# Patient Record
Sex: MALE | Race: WHITE | Employment: FULL TIME | URBAN - METROPOLITAN AREA
[De-identification: names, ages, dates, MRNs, and addresses within clinical notes are randomized per-mention and may not be internally consistent; named-entity substitution may affect disease eponyms.]

---

## 2018-12-07 ENCOUNTER — HOSPITAL ENCOUNTER (EMERGENCY)
Age: 53
Discharge: HOME | End: 2018-12-07
Attending: EMERGENCY MEDICINE
Payer: COMMERCIAL

## 2018-12-07 VITALS
HEIGHT: 71 IN | WEIGHT: 195 LBS | HEART RATE: 88 BPM | OXYGEN SATURATION: 96 % | TEMPERATURE: 98.8 F | BODY MASS INDEX: 27.3 KG/M2 | DIASTOLIC BLOOD PRESSURE: 65 MMHG | RESPIRATION RATE: 17 BRPM | SYSTOLIC BLOOD PRESSURE: 114 MMHG

## 2018-12-07 DIAGNOSIS — I82.4Z1 ACUTE DEEP VEIN THROMBOSIS (DVT) OF DISTAL VEIN OF RIGHT LOWER EXTREMITY (HCC): Primary | ICD-10-CM

## 2018-12-07 PROCEDURE — 74011250637 HC RX REV CODE- 250/637: Performed by: STUDENT IN AN ORGANIZED HEALTH CARE EDUCATION/TRAINING PROGRAM

## 2018-12-07 PROCEDURE — 99283 EMERGENCY DEPT VISIT LOW MDM: CPT

## 2018-12-07 PROCEDURE — 93971 EXTREMITY STUDY: CPT

## 2018-12-07 RX ADMIN — RIVAROXABAN 15 MG: 15 TABLET, FILM COATED ORAL at 22:52

## 2018-12-07 NOTE — LETTER
NOTIFICATION RETURN TO WORK / SCHOOL 
 
12/7/2018 10:19 PM 
 
Mr. Blossom Genao [address] [place] [address] To Whom It May Concern: Blossom Genao is currently under the care of [facility] EMERGENCY DEPT. He will return to work/school on: 12/10/18. If there are questions or concerns please have the patient contact our office. Sincerely, Edwin Wakefield M.D.

## 2018-12-08 NOTE — ED PROVIDER NOTES
47yo male with no prior history who presents with right calf pain x3 days. Symptoms are described as throbbing, non-radiating pain in right calf rated as 6/10. The pain is present at rest but worse with movement, especially dorsiflexion. The patient has taken ASA for 2 days without relief. He was seen today at [facility] for this complaint, was found to have an elevated D-dimer, and was sent to the ED for further eval. The patient denies swelling, erythema, SOB, chest pain, palpations, or recent illness. He denies a personal or family history of blood clots or PE, prolonged immobility, or travel. Social hx: Drinks 3-6 beers daily and smokes marijuana socially. He denies tobacco use. No past medical history on file. No past surgical history on file. No family history on file. Social History Socioeconomic History  Marital status: SINGLE Spouse name: Not on file  Number of children: Not on file  Years of education: Not on file  Highest education level: Not on file Social Needs  Financial resource strain: Not on file  Food insecurity - worry: Not on file  Food insecurity - inability: Not on file  Transportation needs - medical: Not on file  Transportation needs - non-medical: Not on file Occupational History  Not on file Tobacco Use  Smoking status: Not on file Substance and Sexual Activity  Alcohol use: Not on file  Drug use: Not on file  Sexual activity: Not on file Other Topics Concern  Not on file Social History Narrative  Not on file ALLERGIES: Patient has no known allergies. Review of Systems Constitutional: Negative for chills, fatigue and fever. HENT: Negative. Respiratory: Negative for cough, chest tightness, shortness of breath and wheezing. Cardiovascular: Negative for chest pain, palpitations and leg swelling. Gastrointestinal: Negative. Genitourinary: Negative. Musculoskeletal: Right calf pain Skin: Negative for color change. Neurological: Negative for syncope, weakness and light-headedness. Vitals:  
 12/07/18 2031 BP: 114/65 Pulse: 88 Resp: 17 Temp: 98.8 °F (37.1 °C) SpO2: 96% Weight: 88.5 kg (195 lb) Height: 5' 11\" (1.803 m) Physical Exam  
Constitutional: He is oriented to person, place, and time. He appears well-developed and well-nourished. HENT:  
Head: Normocephalic and atraumatic. Eyes: Conjunctivae and EOM are normal. Pupils are equal, round, and reactive to light. Neck: Normal range of motion. Neck supple. Cardiovascular: Normal rate, regular rhythm, normal heart sounds and intact distal pulses. Pulmonary/Chest: Effort normal and breath sounds normal.  
Abdominal: Soft. Bowel sounds are normal.  
Musculoskeletal: Normal range of motion. Non-pitting edema RLE>LLE up to mid-calf. Mild erythema in RLE. Neurological: He is alert and oriented to person, place, and time. Skin: Skin is warm and dry. MDM Number of Diagnoses or Management Options Acute deep vein thrombosis (DVT) of distal vein of right lower extremity [facility]):  
Diagnosis management comments: 54yo with no prior history who presents with right calf pain and swelling concerning for unprovoked DVT. D-dimer reported as elevated at outside facility. Exam reveals tenderness to palpation of right calf, mild erythema, and RLE edema. VSS. Will obtain LE doppler. 22:15- LE doppler shows right saphenous and popliteal DVT. Will give Xarelto 15mg once and discharge home with prescription for Xarelto. Recommended f/u with Hematology and PCP. Procedures

## 2018-12-08 NOTE — ED TRIAGE NOTES
Pt reports right lower leg pain x 3 days that has been getting worse. Has taken aspirin the past 2 days with no relief. Denies recent long travels. Seen at [facility] today, (+)elevated D dimer. Sent to r/o DVT. Denies hx of blood clots or on blood thinners.

## 2018-12-08 NOTE — PROCEDURES
[facility]  *** FINAL REPORT ***    Name: Tomasa Hubbard  MRN: [id number]    Inpatient  : 1965  HIS Order #: [id number]  [id number] [facility] Visit #: [id number]  Date: 07 Dec 2018    TYPE OF TEST: Peripheral Venous Testing    REASON FOR TEST  Pain in limb    Right Leg:-  Deep venous thrombosis:           Yes  Proximal extent of thrombus:      Popliteal Below The Knee  Superficial venous thrombosis:    No  Deep venous insufficiency:        No  Superficial venous insufficiency: No      INTERPRETATION/FINDINGS  PROCEDURE:  RIGHT LOWER EXTREMITY  VENOUS DUPLEX. Evaluation of lower  extremity veins with ultrasound (B-mode imaging, pulsed Doppler, color   Doppler). Includes the common femoral, deep femoral, femoral,  popliteal, posterior tibial, peroneal, and great saphenous veins. Other veins, for example the gastrocnemius and soleal veins, may also  be visualized. FINDINGS: There is evidence of Acute occlusive deep vein thrombosis in   the right posterior and peroneal veins and indeterminated age  thrombus noted in the distal popliteal vein. Gray scale and color flow   duplex images of the remaining veins in the right lower extremity  demonstrate normal compressibility, spontaneous and augmented flow  profiles, and absence of filling defects throughout the deep and  superficial veins in the right lower extremity. CONCLUSION:Right lower extremity venous duplex positive for deep  venous thrombosis in the veins listed above or thrombophlebitis. Left  common femoral vein is thrombus free. ADDITIONAL COMMENTS    I have personally reviewed the data relevant to the interpretation of  this  study. TECHNOLOGIST: Renetta Hare  Signed: 2018 10:49 PM    PHYSICIAN: Víctor Yeh.  Frannie Law MD  Signed: 12/10/2018 08:00 AM

## 2019-01-02 ENCOUNTER — DOCUMENTATION ONLY (OUTPATIENT)
Dept: ONCOLOGY | Age: 54
End: 2019-01-02

## 2019-01-02 ENCOUNTER — OFFICE VISIT (OUTPATIENT)
Dept: ONCOLOGY | Age: 54
End: 2019-01-02

## 2019-01-02 VITALS
BODY MASS INDEX: 27.16 KG/M2 | TEMPERATURE: 98.5 F | SYSTOLIC BLOOD PRESSURE: 130 MMHG | HEART RATE: 64 BPM | WEIGHT: 194 LBS | RESPIRATION RATE: 20 BRPM | HEIGHT: 71 IN | DIASTOLIC BLOOD PRESSURE: 85 MMHG | OXYGEN SATURATION: 98 %

## 2019-01-02 DIAGNOSIS — I82.431 ACUTE DEEP VEIN THROMBOSIS (DVT) OF POPLITEAL VEIN OF RIGHT LOWER EXTREMITY (HCC): Primary | ICD-10-CM

## 2019-01-02 RX ORDER — RANITIDINE 150 MG/1
150 TABLET, FILM COATED ORAL 2 TIMES DAILY
COMMUNITY
End: 2020-01-09 | Stop reason: ALTCHOICE

## 2019-01-02 RX ORDER — BISMUTH SUBSALICYLATE 262 MG
1 TABLET,CHEWABLE ORAL DAILY
COMMUNITY

## 2019-01-02 RX ORDER — CLONAZEPAM 1 MG/1
TABLET ORAL
Refills: 1 | COMMUNITY
Start: 2018-12-22

## 2019-01-02 RX ORDER — MULTIVIT WITH MINERALS/HERBS
1 TABLET ORAL DAILY
COMMUNITY

## 2019-01-02 RX ORDER — CHOLECALCIFEROL (VITAMIN D3) 125 MCG
CAPSULE ORAL
COMMUNITY

## 2019-01-02 RX ORDER — BUPROPION HYDROCHLORIDE 300 MG/1
TABLET ORAL
Refills: 0 | COMMUNITY
Start: 2018-12-27

## 2019-01-02 RX ORDER — DOXEPIN HYDROCHLORIDE 10 MG/1
CAPSULE ORAL
Refills: 1 | COMMUNITY
Start: 2018-12-21

## 2019-01-02 NOTE — PROGRESS NOTES
[facility] [place] at [place] [address] [address] [facility], [address] [place] [address] 1007 [facility] W: [phone]  F: [phone] Reason for Visit:  
Phoenix May III is a 46 y.o. male who is seen in consultation at the request of Dr. Liliane Cook [facility] ED) for evaluation of DVT in right calf. History of Present Illness: Ez Allen is a pleasant 46 y.o. male who presents today for evaluation of DVT. Last month he developed several days of right calf pain. Took ASA without relief. Seen at [facility] with labs demonstrating an elevated D-dimer, prompting referral to the ED where an ultrasound confirmed an acute DVT. He was started on rivaroxaban. He is tolerating this well, no bleeding, dose now 20mg PO daily. His leg pain has resolved. Denies cough or dyspnea. Denies personal or family history of VTE. Denies recent risk factors for VTE. No surgeries, hospitalizations, trauma, immobilization, hormone therapy, or long travel. He does travel to [place] frequently to visit his son who is a family practioner at [employer], but these flights aren't extremely long. No tobacco in >30 years. Colonoscopy last year. PSA checked recently with his PCP per his report. Past Medical History:  
Diagnosis Date  Acute deep vein thrombosis (DVT) of right popliteal vein ([address] 12/07/2018  Anxiety  Depression Past Surgical History:  
Procedure Laterality Date  HX APPENDECTOMY  HX CHOLECYSTECTOMY  HX TONSILLECTOMY Social History Tobacco Use  Smoking status: Not on file Substance Use Topics  Alcohol use: Not on file No family history on file. Current Outpatient Medications Medication Sig  
 rivaroxaban (XARELTO) 15 mg (42)- 20 mg (9) DsPk Take one 15 mg tablet twice a day with food for the first 21 days. Then, take one 20 mg tablet once a day with food for 9 days. No current facility-administered medications for this visit. No Known Allergies Review of Systems: A complete review of systems was obtained, negative except as described above. Physical Exam:  
 
Visit Vitals /85 (BP 1 Location: Right arm, BP Patient Position: Sitting) Pulse 64 Temp 98.5 °F (36.9 °C) (Oral) Resp 20 Ht 5' 11\" (1.803 m) Wt 194 lb (88 kg) SpO2 98% BMI 27.06 kg/m² General: No distress Eyes: PERRLA, anicteric sclerae HENT: Atraumatic, OP clear Neck: Supple Lymphatic: No cervical, supraclavicular, or inguinal adenopathy Respiratory: CTAB, normal respiratory effort CV: Normal rate, regular rhythm, no murmurs, no peripheral edema GI: Soft, nontender, nondistended, no masses, no hepatomegaly, no splenomegaly MS: Normal gait and station. Digits without clubbing or cyanosis. Skin: No rashes, ecchymoses, or petechiae. Normal temperature, turgor, and texture. Psych: Alert, oriented, appropriate affect, normal judgment/insight Results:  
Right LE Doppler 12/7/2018: acute DVT in right popliteal vein Records reviewed and summarized above. Assessment:  
1) Right LE DVT, proximal, unprovoked Diagnosed 12/7/2018. No identifiable risk factors at that time. Currently on treatment with rivaroxaban. The current ACCP guidelines recommend patients receive a minimum of three months of anticoagulation after a VTE, during which time they are at the highest risk of progression of their thrombosis. However, patients with unprovoked events, such as this, remain at a high risk of recurrence once anticoagulation is stopped. Specifically, a 10% risk of recurrence within the first year, and 5% annual risk thereafter. Meanwhile, the risk of major bleeding on anticoagulation is <1% annually.   Based on this risk/benefit ratio, the guidelines recommend continuation of anticoagulation long-term for these patients, as long as the benefit continues to outweigh the risks. It was my recommendation today that anticoagulation be continued long term, with periodic evaluation to assess the bleeding risk, fall risk, and the effect therapy is having on the patient's lifestyle. I do not recommend thrombophilia testing at this time. The past history of a VTE poses a greater risk for additional events than anything we would uncover on testing. As long as the patient is comfortable continuing with long-term anticoagulation, thrombophilia testing is not necessary. Therapy with rivaroxaban is appropriate. We also discussed the option of apixaban, which his son had mentioned to him, and we could certainly make this change if he would like. I advised him to check on costs through his insurance, as that may be a determining factor for him. I also had our  meet with him to discuss copay card to help with costs. Patients with unprovoked DVT should undergo a basic evaluation of malignancy, to include basic labs, CXR, and routine cancer screening. He reports that he is uptodate on colonoscopy and PSA screening, and does not qualify for low-dose Chest CT screening. Plan:  
 
· Labs: CBC, CMP · CXR · Continue rivaroxaban 20mg PO daily · Continue anticoagulation long-term · Return to see me in 1 year I appreciate the opportunity to participate in Mr. Nehemias Hart III's care.  
 
Signed By: Aggie Anguiano MD

## 2019-01-02 NOTE — PROGRESS NOTES
[address]  Social Work Navigator Encounter     Patient Name:  Tom Kruse    Narrative: Patient requested Xarelto discount card. Explained to patient Out Financial Counselor will contact [address] [place] [address] to activate discount card and update patient's pharmacy. Discount card will be good for a year and the copay will be $10. Patient voiced understanding and appreciation. Plan:   1. Utilize Xarelto discount card so prescription is more affordable.     Thank you,  STEVEN Oreilly

## 2019-01-04 ENCOUNTER — TELEPHONE (OUTPATIENT)
Dept: INFUSION THERAPY | Age: 54
End: 2019-01-04

## 2019-08-15 DIAGNOSIS — I82.431 ACUTE DEEP VEIN THROMBOSIS (DVT) OF POPLITEAL VEIN OF RIGHT LOWER EXTREMITY (HCC): ICD-10-CM

## 2019-08-15 RX ORDER — RIVAROXABAN 20 MG/1
TABLET, FILM COATED ORAL
Qty: 90 TAB | Refills: 0 | Status: SHIPPED | OUTPATIENT
Start: 2019-08-15 | End: 2019-11-13 | Stop reason: SDUPTHER

## 2019-12-29 NOTE — PROGRESS NOTES
[facility] [place] at [place] [address] [address] [facility]  Nicolasa N[place]: [phone]  F: [phone]      Reason for Visit:   Arjun Egan is a 53 y.o. male who is seen for follow up of DVT. History of Present Illness:   He remains on rivaroxaban, tolerating this well, no new issues. No bleeding. Energy good. No leg pain or swelling. No dyspnea or chest pain. PAST HISTORY: The following sections were reviewed and updated in the EMR as appropriate: PMH, SH, FH, Medications, Allergies. No Known Allergies   Review of Systems: A complete review of systems was obtained, reviewed, and scanned into the EMR. Pertinent findings reviewed above. Physical Exam:     Visit Vitals  /71 (BP 1 Location: Left arm, BP Patient Position: Supine)   Pulse 77   Temp 98.4 °F (36.9 °C) (Temporal)   Resp 16   Ht 5' 11\" (1.803 m)   Wt 183 lb (83 kg)   SpO2 98%   BMI 25.52 kg/m²     General: No distress  Eyes: PERRLA, anicteric sclerae  HENT: Atraumatic, OP clear  Neck: Supple  Lymphatic: No cervical, supraclavicular, or inguinal adenopathy  Respiratory: CTAB, normal respiratory effort  CV: Normal rate, regular rhythm, no murmurs, no peripheral edema  GI: Soft, nontender, nondistended, no masses, no hepatomegaly, no splenomegaly  MS: Normal gait and station. Digits without clubbing or cyanosis. Skin: No rashes, ecchymoses, or petechiae. Normal temperature, turgor, and texture. Psych: Alert, oriented, appropriate affect, normal judgment/insight    Results:   Right LE Doppler 12/7/2018: acute DVT in right popliteal vein        Assessment:   1) Right LE DVT, proximal, unprovoked  Diagnosed 12/7/2018. No identifiable risk factors at that time. Currently on treatment with rivaroxaban. Long-term anticoagulation has been recommended, see initial consult note for details. He is tolerating rivaroxaban well, we will continue with this treatment.     A CXR was recommended in patients with unprovoked VTE as part of a basic evaluation for occult malignancy, but he did not follow through with this. He is now willing. 2) Cancer screening  He reports he is uptodate on colonoscopy and PSA screening.     Plan:     · CXR  · Labs yearly while on anticoagulation: CBC, CMP (he wants to do this with his PCP at their annual visit)  · Continue rivaroxaban 20mg PO daily  · Continue anticoagulation long-term  · Return to see me in 1 year    Signed By: Janice Ledesma MD

## 2020-01-09 ENCOUNTER — OFFICE VISIT (OUTPATIENT)
Dept: ONCOLOGY | Age: 55
End: 2020-01-09

## 2020-01-09 VITALS
OXYGEN SATURATION: 98 % | BODY MASS INDEX: 25.62 KG/M2 | HEART RATE: 77 BPM | WEIGHT: 183 LBS | DIASTOLIC BLOOD PRESSURE: 71 MMHG | RESPIRATION RATE: 16 BRPM | SYSTOLIC BLOOD PRESSURE: 113 MMHG | HEIGHT: 71 IN | TEMPERATURE: 98.4 F

## 2020-01-09 DIAGNOSIS — I82.431 ACUTE DEEP VEIN THROMBOSIS (DVT) OF POPLITEAL VEIN OF RIGHT LOWER EXTREMITY (HCC): Primary | ICD-10-CM

## 2020-01-09 RX ORDER — FAMOTIDINE 10 MG/1
10 TABLET ORAL 2 TIMES DAILY
COMMUNITY

## 2020-01-09 RX ORDER — DULOXETIN HYDROCHLORIDE 60 MG/1
CAPSULE, DELAYED RELEASE ORAL
COMMUNITY
Start: 2019-11-19

## 2020-01-09 NOTE — PROGRESS NOTES
[facility] is a 53 y.o. male follow up for DVT. 1. Have you been to the ER, urgent care clinic since your last visit? Hospitalized since your last visit?no     2. Have you seen or consulted any other health care providers outside of the [address] [place] [address] since your last visit? Include any pap smears or colon screening.  no

## 2021-03-13 DIAGNOSIS — I82.431 ACUTE DEEP VEIN THROMBOSIS (DVT) OF POPLITEAL VEIN OF RIGHT LOWER EXTREMITY (HCC): ICD-10-CM

## 2021-03-15 RX ORDER — RIVAROXABAN 20 MG/1
TABLET, FILM COATED ORAL
Qty: 90 TAB | Refills: 3 | Status: SHIPPED | OUTPATIENT
Start: 2021-03-15 | End: 2021-06-08 | Stop reason: SDUPTHER

## 2021-03-15 NOTE — TELEPHONE ENCOUNTER
[address] at [facility]  [phone]    03/15/21- Phone call placed to patient advised him he is past due for follow up with - he is willing to schedule fu soon. Advised him okay to refill one month supply to last him until he comes in for fu- he requested a 3 month supply as he has to use a coupon due to insurance purposes. Phone call transferred to  staff to schedule fu and okay to refill medication.

## 2021-04-01 ENCOUNTER — TELEPHONE (OUTPATIENT)
Dept: ONCOLOGY | Age: 56
End: 2021-04-01

## 2021-04-01 NOTE — TELEPHONE ENCOUNTER
[facility] at [facility]  [phone]    04/01/21- Phone call placed to pt to remind pt to have labs drawn prior to his follow up appointment with .  left.

## 2021-04-13 LAB
ALBUMIN SERPL-MCNC: 4.1 G/DL (ref 3.5–5)
ALBUMIN/GLOB SERPL: 1.3 {RATIO} (ref 1.1–2.2)
ALP SERPL-CCNC: 79 U/L (ref 45–117)
ALT SERPL-CCNC: 29 U/L (ref 12–78)
ANION GAP SERPL CALC-SCNC: 6 MMOL/L (ref 5–15)
AST SERPL-CCNC: 11 U/L (ref 15–37)
BASOPHILS # BLD: 0.1 K/UL (ref 0–0.1)
BASOPHILS NFR BLD: 1 % (ref 0–1)
BILIRUB SERPL-MCNC: 0.5 MG/DL (ref 0.2–1)
BUN SERPL-MCNC: 13 MG/DL (ref 6–20)
BUN/CREAT SERPL: 12 (ref 12–20)
CALCIUM SERPL-MCNC: 9 MG/DL (ref 8.5–10.1)
CHLORIDE SERPL-SCNC: 104 MMOL/L (ref 97–108)
CO2 SERPL-SCNC: 30 MMOL/L (ref 21–32)
CREAT SERPL-MCNC: 1.11 MG/DL (ref 0.7–1.3)
DIFFERENTIAL METHOD BLD: NORMAL
EOSINOPHIL # BLD: 0.2 K/UL (ref 0–0.4)
EOSINOPHIL NFR BLD: 4 % (ref 0–7)
ERYTHROCYTE [DISTWIDTH] IN BLOOD BY AUTOMATED COUNT: 13.3 % (ref 11.5–14.5)
GLOBULIN SER CALC-MCNC: 3.1 G/DL (ref 2–4)
GLUCOSE SERPL-MCNC: 112 MG/DL (ref 65–100)
HCT VFR BLD AUTO: 44.6 % (ref 36.6–50.3)
HGB BLD-MCNC: 14.5 G/DL (ref 12.1–17)
IMM GRANULOCYTES # BLD AUTO: 0 K/UL (ref 0–0.04)
IMM GRANULOCYTES NFR BLD AUTO: 0 % (ref 0–0.5)
LYMPHOCYTES # BLD: 2 K/UL (ref 0.8–3.5)
LYMPHOCYTES NFR BLD: 38 % (ref 12–49)
MCH RBC QN AUTO: 31.2 PG (ref 26–34)
MCHC RBC AUTO-ENTMCNC: 32.5 G/DL (ref 30–36.5)
MCV RBC AUTO: 95.9 FL (ref 80–99)
MONOCYTES # BLD: 0.5 K/UL (ref 0–1)
MONOCYTES NFR BLD: 9 % (ref 5–13)
NEUTS SEG # BLD: 2.6 K/UL (ref 1.8–8)
NEUTS SEG NFR BLD: 48 % (ref 32–75)
NRBC # BLD: 0 K/UL (ref 0–0.01)
NRBC BLD-RTO: 0 PER 100 WBC
PLATELET # BLD AUTO: 216 K/UL (ref 150–400)
PMV BLD AUTO: 11.7 FL (ref 8.9–12.9)
POTASSIUM SERPL-SCNC: 4.3 MMOL/L (ref 3.5–5.1)
PROT SERPL-MCNC: 7.2 G/DL (ref 6.4–8.2)
RBC # BLD AUTO: 4.65 M/UL (ref 4.1–5.7)
SODIUM SERPL-SCNC: 140 MMOL/L (ref 136–145)
WBC # BLD AUTO: 5.3 K/UL (ref 4.1–11.1)

## 2021-06-04 NOTE — PROGRESS NOTES
[facility] [place] at [place] [address] [address] [facility] 100[address] [facility] [place]: [phone]  F: [phone]      Reason for Visit:   Matheus Sweeney is a 61 y.o. male who is seen for follow up of DVT. History of Present Illness:   He continues to take rivaroxaban. Compliant with therapy, tolerating it well, no significant bleeding. He denies any new medical problems in the past year. No leg pain or swelling. No chest pain or dyspnea. He does have some skin discoloration in his right foot which has progressed over time. He has seen dermatology about this and was advised to see vascular surgery. 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 His son is a family medicine physician at [facility] [employer]. Review of systems was obtained and pertinent findings reviewed above. Past medical history, social history, family history, medications, and allergies are located in the electronic medical record. Physical Exam:     Visit Vitals  BP (!) 142/88 (BP 1 Location: Right arm, BP Patient Position: Sitting, BP Cuff Size: Large adult)   Pulse 86   Temp 98.1 °F (36.7 °C) (Temporal)   Resp 16   Ht 5' 11\" (1.803 m)   Wt 199 lb 6.4 oz (90.4 kg)   SpO2 98%   BMI 27.81 kg/m²     General: no distress  Respiratory: normal respiratory effort  CV: no peripheral edema  Skin: no rashes; no ecchymoses; no petechiae. Venous stasis changes in right foot/ankle        Results:     Lab Results   Component Value Date/Time    WBC 5.3 04/13/2021 11:23 AM    HGB 14.5 04/13/2021 11:23 AM    HCT 44.6 04/13/2021 11:23 AM    PLATELET 114 04/06/0332 11:23 AM    MCV 95.9 04/13/2021 11:23 AM    ABS.  NEUTROPHILS 2.6 04/13/2021 11:23 AM     Lab Results   Component Value Date/Time    Sodium 140 04/13/2021 11:23 AM    Potassium 4.3 04/13/2021 11:23 AM    Chloride 104 04/13/2021 11:23 AM    CO2 30 04/13/2021 11:23 AM    Glucose 112 (H) 04/13/2021 11:23 AM    BUN 13 04/13/2021 11:23 AM    Creatinine 1.11 04/13/2021 11:23 AM    GFR est AA >60 04/13/2021 11:23 AM    GFR est non-AA >60 04/13/2021 11:23 AM    Calcium 9.0 04/13/2021 11:23 AM     Lab Results   Component Value Date/Time    Bilirubin, total 0.5 04/13/2021 11:23 AM    ALT (SGPT) 29 04/13/2021 11:23 AM    Alk. phosphatase 79 04/13/2021 11:23 AM    Protein, total 7.2 04/13/2021 11:23 AM    Albumin 4.1 04/13/2021 11:23 AM    Globulin 3.1 04/13/2021 11:23 AM       Right LE Doppler 12/7/2018: acute DVT in right popliteal vein        Assessment:   1) Right LE DVT, proximal, unprovoked  Diagnosed 12/7/2018. No identifiable risk factors at that time. Currently on treatment with rivaroxaban. Long-term anticoagulation has been recommended, see initial consult note for details. He is tolerating rivaroxaban well, we will continue with this treatment. I have recommended and ordered a CXR on several occasions, which is recommended in patients with unprovoked VTE as part of a basic evaluation for occult malignancy. He has not had this done. 2) Venous stasis changes  Noted in right ankle. Dermatology has referred him to vascular surgery, but he has not shceduled this. Reviewed today, he will continue to think about this.     Plan:     · Labs yearly while on anticoagulation: CBC, CMP  · Continue rivaroxaban 20mg PO daily  · Continue anticoagulation long-term  · Return to see me in 1 year    Signed By: Jose Mortensen MD

## 2021-06-08 ENCOUNTER — OFFICE VISIT (OUTPATIENT)
Dept: ONCOLOGY | Age: 56
End: 2021-06-08
Payer: COMMERCIAL

## 2021-06-08 VITALS
TEMPERATURE: 98.1 F | HEIGHT: 71 IN | HEART RATE: 86 BPM | SYSTOLIC BLOOD PRESSURE: 142 MMHG | BODY MASS INDEX: 27.92 KG/M2 | WEIGHT: 199.4 LBS | RESPIRATION RATE: 16 BRPM | DIASTOLIC BLOOD PRESSURE: 88 MMHG | OXYGEN SATURATION: 98 %

## 2021-06-08 DIAGNOSIS — I82.431 ACUTE DEEP VEIN THROMBOSIS (DVT) OF POPLITEAL VEIN OF RIGHT LOWER EXTREMITY (HCC): Primary | ICD-10-CM

## 2021-06-08 PROCEDURE — 99214 OFFICE O/P EST MOD 30 MIN: CPT | Performed by: INTERNAL MEDICINE

## 2021-06-08 RX ORDER — DULOXETIN HYDROCHLORIDE 30 MG/1
CAPSULE, DELAYED RELEASE ORAL
COMMUNITY
Start: 2021-04-09

## 2021-06-08 NOTE — PROGRESS NOTES
Roxy Medina is a 61 y.o. male follow up for DVT. 1. Have you been to the ER, urgent care clinic since your last visit? Hospitalized since your last visit?no     2. Have you seen or consulted any other health care providers outside of the [address] [place] [address] since your last visit? Include any pap smears or colon screening.  no

## 2022-03-19 PROBLEM — I82.431 ACUTE DEEP VEIN THROMBOSIS (DVT) OF POPLITEAL VEIN OF RIGHT LOWER EXTREMITY (HCC): Status: ACTIVE | Noted: 2019-01-02

## 2022-06-06 ENCOUNTER — TELEPHONE (OUTPATIENT)
Dept: ONCOLOGY | Age: 57
End: 2022-06-06

## 2022-06-06 NOTE — TELEPHONE ENCOUNTER
[address] at [facility]  [phone]    06/06/22- Phone call returned to patient he reported he stopped taking xarelto approximately 6 months ago due to daily nose bleeding. Once he stopped medication bleeding resolved and no further concerns verbalized- no chest pain, SOB, swelling, redness or other symptoms. He is wanting to cancel his upcoming appointment. Advised him per  last note he wanted him to be on anticoagulation long term. He does not want to take medication or to follow up at this time \" I feel okay and my father is a doctor at the [facility] [employer] [facility] [employer] and he is guiding me\". He denied any further questions or concerns.  informed.

## 2022-06-06 NOTE — TELEPHONE ENCOUNTER
Patient called and stated that he can not make it to his appointment this week.  But he would like a call back about Xarelto that he stopped taking  CB# [phone]

## 2022-08-22 NOTE — PROGRESS NOTES
[facility] [place] at [place] [address]  [address] [facility], [address] [facility] 1007 [facility]  B[place]ender: [phone]  F: [phone]      Reason for Visit:   Arno Klinefelter is a 61 y.o. male who is seen for follow up of DVT. History of Present Illness:   He stopped taking his rivaroxaban around 1/2022 due to epistaxis, though he did not call us at that time. He was having significant epistaxis at that time, about 1-2x per week, mostly in the mornings. Stopped after 5-10 minutes. In June he called and cancelled his follow up appointment. Earlier this month he flew to [facility], and while there he began to develop recurrent left calf/leg pain. He was concerned for recurrent VTE, though he did not have dopplers done. He resumed his Xarelto earlier this month and his symptoms improved within a few days. Now on 20mg PO daily. 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 Review of systems was obtained and pertinent findings reviewed above. Past medical history, social history, family history, medications, and allergies are located in the electronic medical record. Physical Exam:     Visit Vitals  /88 (BP 1 Location: Right arm, BP Patient Position: Sitting, BP Cuff Size: Large adult)   Pulse 78   Temp 98.5 °F (36.9 °C) (Esophageal)   Resp 16   Ht 5' 11\" (1.803 m)   Wt 196 lb (88.9 kg)   SpO2 97%   BMI 27.34 kg/m²       General: no distress  Respiratory: normal respiratory effort  CV: no peripheral edema  Skin: no rashes; no ecchymoses; no petechiae. Venous stasis changes in right foot/ankle        Results:     Lab Results   Component Value Date/Time    WBC 5.3 04/13/2021 11:23 AM    HGB 14.5 04/13/2021 11:23 AM    HCT 44.6 04/13/2021 11:23 AM    PLATELET 959 06/28/9901 11:23 AM    MCV 95.9 04/13/2021 11:23 AM    ABS.  NEUTROPHILS 2.6 04/13/2021 11:23 AM     Lab Results   Component Value Date/Time    Sodium 140 04/13/2021 11:23 AM    Potassium 4.3 04/13/2021 11:23 AM    Chloride 104 04/13/2021 11:23 AM    CO2 30 04/13/2021 11:23 AM    Glucose 112 (H) 04/13/2021 11:23 AM    BUN 13 04/13/2021 11:23 AM    Creatinine 1.11 04/13/2021 11:23 AM    GFR est AA >60 04/13/2021 11:23 AM    GFR est non-AA >60 04/13/2021 11:23 AM    Calcium 9.0 04/13/2021 11:23 AM     Lab Results   Component Value Date/Time    Bilirubin, total 0.5 04/13/2021 11:23 AM    ALT (SGPT) 29 04/13/2021 11:23 AM    Alk. phosphatase 79 04/13/2021 11:23 AM    Protein, total 7.2 04/13/2021 11:23 AM    Albumin 4.1 04/13/2021 11:23 AM    Globulin 3.1 04/13/2021 11:23 AM       Right LE Doppler 12/7/2018: acute DVT in right popliteal vein        Assessment:   1) Right LE DVT, proximal, unprovoked  Diagnosed 12/7/2018. No identifiable risk factors at that time. Treated with rivaroxaban through 1/2022, patient stopped on his own due to epistaxis. 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 Long-term anticoagulation has been recommended, see initial consult note for details. After he has been back on therapy for 6 months, we can consider reducing his dose to 10mg PO daily. 2) Venous stasis changes  Noted in right ankle. Dermatology has referred him to vascular surgery, but he has not shceduled this. Reviewed today, he will continue to think about this. 3) Epistaxis  Occurred in the winter when on Xarelto, mostly resolved now. Using water with his CPAP which is helping. Consider ENT evaluation if this is a recurring issue.     Plan:     Continue rivaroxaban 20mg PO daily  Labs yearly while on anticoagulation: CBC, CMP  Continue anticoagulation long-term  Return to see me in 1 year    Signed By: Kaley Atkins MD

## 2022-08-24 ENCOUNTER — OFFICE VISIT (OUTPATIENT)
Dept: ONCOLOGY | Age: 57
End: 2022-08-24
Payer: COMMERCIAL

## 2022-08-24 VITALS
HEART RATE: 78 BPM | BODY MASS INDEX: 27.44 KG/M2 | WEIGHT: 196 LBS | RESPIRATION RATE: 16 BRPM | DIASTOLIC BLOOD PRESSURE: 88 MMHG | TEMPERATURE: 98.5 F | OXYGEN SATURATION: 97 % | SYSTOLIC BLOOD PRESSURE: 130 MMHG | HEIGHT: 71 IN

## 2022-08-24 DIAGNOSIS — I82.431 ACUTE DEEP VEIN THROMBOSIS (DVT) OF POPLITEAL VEIN OF RIGHT LOWER EXTREMITY (HCC): Primary | ICD-10-CM

## 2022-08-24 PROCEDURE — 99214 OFFICE O/P EST MOD 30 MIN: CPT | Performed by: INTERNAL MEDICINE

## 2022-08-25 ENCOUNTER — PATIENT MESSAGE (OUTPATIENT)
Dept: ONCOLOGY | Age: 57
End: 2022-08-25

## 2022-09-06 ENCOUNTER — TELEPHONE (OUTPATIENT)
Dept: ONCOLOGY | Age: 57
End: 2022-09-06

## 2022-09-06 NOTE — TELEPHONE ENCOUNTER
[address] at [facility]  [phone]    09/06/22- Informed pt of results per  below- he verbalized understanding. Your labs look good. Your potassium is just slightly elevated. You may want to try reducing the potassium in your diet. I recommend having this rechecked with you primary care physician in a few months.

## 2022-09-06 NOTE — TELEPHONE ENCOUNTER
[address] at [facility]  [phone]    Patient has not ready the [employer] message I sent him previously about his labs. Please call and inform him:    <<<  Your labs look good. Your potassium is just slightly elevated. You may want to try reducing the potassium in your diet.   I recommend having this rechecked with you primary care physician in a few months.  >>>

## 2023-08-23 ENCOUNTER — OFFICE VISIT (OUTPATIENT)
Age: 58
End: 2023-08-23
Payer: COMMERCIAL

## 2023-08-23 VITALS
DIASTOLIC BLOOD PRESSURE: 95 MMHG | OXYGEN SATURATION: 98 % | RESPIRATION RATE: 18 BRPM | WEIGHT: 197 LBS | TEMPERATURE: 99 F | BODY MASS INDEX: 27.58 KG/M2 | HEIGHT: 71 IN | HEART RATE: 75 BPM | SYSTOLIC BLOOD PRESSURE: 150 MMHG

## 2023-08-23 DIAGNOSIS — I82.431 ACUTE EMBOLISM AND THROMBOSIS OF RIGHT POPLITEAL VEIN (HCC): Primary | ICD-10-CM

## 2023-08-23 DIAGNOSIS — I82.431 ACUTE EMBOLISM AND THROMBOSIS OF RIGHT POPLITEAL VEIN (HCC): ICD-10-CM

## 2023-08-23 PROCEDURE — 99214 OFFICE O/P EST MOD 30 MIN: CPT | Performed by: INTERNAL MEDICINE

## 2023-08-23 RX ORDER — RIVAROXABAN 10 MG/1
10 TABLET, FILM COATED ORAL
Qty: 90 TABLET | Refills: 3 | Status: SHIPPED | OUTPATIENT
Start: 2023-08-23

## 2023-08-23 RX ORDER — M-VIT,TX,IRON,MINS/CALC/FOLIC 27MG-0.4MG
TABLET ORAL DAILY
COMMUNITY

## 2023-08-23 NOTE — PROGRESS NOTES
Clement Santillan III is a 60 y.o. male follow up for VTE. 1. Have you been to the ER, urgent care clinic since your last visit? Hospitalized since your last visit?no     2. Have you seen or consulted any other health care providers outside of the [address] [place] [address] since your last visit? Include any pap smears or colon screening.  no
factors at that time. Treated with rivaroxaban through 1/2022, patient stopped on his own due to epistaxis. 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 He has not been compliant with his Xarelto, in part due to some bleeding issues. We reviewed that long-term anticoagulation is recommended given his unprovoked event, though we could alternatively stop therapy or switch to ASA therapy if he is willing to accept an increased risk of recurrent VTE. He is comfortable resuming therapy. We discussed reducing his dose to 10mg daily to see if this helps with his bleeding, and he is agreeable. Venous stasis changes  Noted in right ankle. He is following with Dermatology. They had referred him to vascular surgery, but he declined this. Epistaxis  Intermittent issue, worsening when on Xarelto. We will reduce his Xarelto dose as above.       Plan:     Start rivaroxaban 10mg PO daily  Labs yearly while on anticoagulation: CBC, CMP  Continue anticoagulation long-term  Return to see me in 1 year        Signed By: Amy Lockett MD

## 2023-09-19 LAB
ALBUMIN SERPL-MCNC: 4.2 G/DL (ref 3.5–5)
ALBUMIN/GLOB SERPL: 1.6 (ref 1.1–2.2)
ALP SERPL-CCNC: 73 U/L (ref 45–117)
ALT SERPL-CCNC: 30 U/L (ref 12–78)
ANION GAP SERPL CALC-SCNC: 2 MMOL/L (ref 5–15)
AST SERPL-CCNC: 12 U/L (ref 15–37)
BASOPHILS # BLD: 0.1 K/UL (ref 0–0.1)
BASOPHILS NFR BLD: 1 % (ref 0–1)
BILIRUB SERPL-MCNC: 0.4 MG/DL (ref 0.2–1)
BUN SERPL-MCNC: 19 MG/DL (ref 6–20)
BUN/CREAT SERPL: 17 (ref 12–20)
CALCIUM SERPL-MCNC: 9.4 MG/DL (ref 8.5–10.1)
CHLORIDE SERPL-SCNC: 102 MMOL/L (ref 97–108)
CO2 SERPL-SCNC: 32 MMOL/L (ref 21–32)
CREAT SERPL-MCNC: 1.14 MG/DL (ref 0.7–1.3)
DIFFERENTIAL METHOD BLD: NORMAL
EOSINOPHIL # BLD: 0.1 K/UL (ref 0–0.4)
EOSINOPHIL NFR BLD: 2 % (ref 0–7)
ERYTHROCYTE [DISTWIDTH] IN BLOOD BY AUTOMATED COUNT: 12.7 % (ref 11.5–14.5)
GLOBULIN SER CALC-MCNC: 2.7 G/DL (ref 2–4)
GLUCOSE SERPL-MCNC: 104 MG/DL (ref 65–100)
HCT VFR BLD AUTO: 46.3 % (ref 36.6–50.3)
HGB BLD-MCNC: 14.9 G/DL (ref 12.1–17)
IMM GRANULOCYTES # BLD AUTO: 0 K/UL (ref 0–0.04)
IMM GRANULOCYTES NFR BLD AUTO: 0 % (ref 0–0.5)
LYMPHOCYTES # BLD: 2 K/UL (ref 0.8–3.5)
LYMPHOCYTES NFR BLD: 33 % (ref 12–49)
MCH RBC QN AUTO: 30.8 PG (ref 26–34)
MCHC RBC AUTO-ENTMCNC: 32.2 G/DL (ref 30–36.5)
MCV RBC AUTO: 95.7 FL (ref 80–99)
MONOCYTES # BLD: 0.5 K/UL (ref 0–1)
MONOCYTES NFR BLD: 8 % (ref 5–13)
NEUTS SEG # BLD: 3.5 K/UL (ref 1.8–8)
NEUTS SEG NFR BLD: 56 % (ref 32–75)
NRBC # BLD: 0 K/UL (ref 0–0.01)
NRBC BLD-RTO: 0 PER 100 WBC
PLATELET # BLD AUTO: 229 K/UL (ref 150–400)
PMV BLD AUTO: 12.4 FL (ref 8.9–12.9)
POTASSIUM SERPL-SCNC: 4.4 MMOL/L (ref 3.5–5.1)
PROT SERPL-MCNC: 6.9 G/DL (ref 6.4–8.2)
RBC # BLD AUTO: 4.84 M/UL (ref 4.1–5.7)
SODIUM SERPL-SCNC: 136 MMOL/L (ref 136–145)
WBC # BLD AUTO: 6.1 K/UL (ref 4.1–11.1)

## 2024-07-12 DIAGNOSIS — I82.431 ACUTE EMBOLISM AND THROMBOSIS OF RIGHT POPLITEAL VEIN (HCC): ICD-10-CM

## 2024-07-15 RX ORDER — RIVAROXABAN 10 MG/1
10 TABLET, FILM COATED ORAL DAILY
Qty: 30 TABLET | Refills: 0 | Status: SHIPPED | OUTPATIENT
Start: 2024-07-15

## 2024-08-19 ENCOUNTER — TELEPHONE (OUTPATIENT)
Age: 59
End: 2024-08-19

## 2024-08-19 NOTE — TELEPHONE ENCOUNTER
Patient called and stated that he needed to reschedule his appt due to his partner being on vacation and not being able to leave work.

## 2024-08-29 DIAGNOSIS — I82.431 ACUTE EMBOLISM AND THROMBOSIS OF RIGHT POPLITEAL VEIN (HCC): ICD-10-CM

## 2024-08-29 RX ORDER — RIVAROXABAN 10 MG/1
10 TABLET, FILM COATED ORAL DAILY
Qty: 90 TABLET | Refills: 3 | Status: SHIPPED | OUTPATIENT
Start: 2024-08-29

## 2024-09-13 ENCOUNTER — OFFICE VISIT (OUTPATIENT)
Age: 59
End: 2024-09-13
Payer: COMMERCIAL

## 2024-09-13 VITALS
SYSTOLIC BLOOD PRESSURE: 140 MMHG | DIASTOLIC BLOOD PRESSURE: 89 MMHG | WEIGHT: 206 LBS | RESPIRATION RATE: 16 BRPM | HEART RATE: 82 BPM | BODY MASS INDEX: 28.73 KG/M2 | OXYGEN SATURATION: 95 % | TEMPERATURE: 98.1 F

## 2024-09-13 DIAGNOSIS — I82.431 ACUTE EMBOLISM AND THROMBOSIS OF RIGHT POPLITEAL VEIN (HCC): Primary | ICD-10-CM

## 2024-09-13 PROCEDURE — 99214 OFFICE O/P EST MOD 30 MIN: CPT | Performed by: INTERNAL MEDICINE

## 2024-09-13 RX ORDER — ARIPIPRAZOLE 5 MG/1
1 TABLET ORAL
COMMUNITY

## 2024-09-13 RX ORDER — ROSUVASTATIN CALCIUM 10 MG/1
TABLET, COATED ORAL
COMMUNITY

## 2024-09-18 ENCOUNTER — TELEPHONE (OUTPATIENT)
Age: 59
End: 2024-09-18